# Patient Record
Sex: FEMALE | Race: WHITE | NOT HISPANIC OR LATINO | Employment: FULL TIME
[De-identification: names, ages, dates, MRNs, and addresses within clinical notes are randomized per-mention and may not be internally consistent; named-entity substitution may affect disease eponyms.]

---

## 2022-05-27 NOTE — TELEPHONE ENCOUNTER
FUTURE VISIT INFORMATION      FUTURE VISIT INFORMATION:    Date: 8/8/22    Time: 8:00am    [place] [facility] [place]  REFERRAL INFORMATION:    Referring providers clinic:  self    Reason for visit/diagnosis  Eye lid/skin tags. Patients mom seen Dr. Sandoval for same issue.    RECORDS REQUESTED FROM:       Clinic name Comments Records Status Imaging Status   Edgar Duran recs to collect

## 2022-05-29 ENCOUNTER — HEALTH MAINTENANCE LETTER (OUTPATIENT)
Age: 37
End: 2022-05-29

## 2022-06-22 ENCOUNTER — TELEPHONE (OUTPATIENT)
Dept: OPHTHALMOLOGY | Facility: CLINIC | Age: 37
End: 2022-06-22

## 2022-06-22 NOTE — TELEPHONE ENCOUNTER
Spoke with patient regarding scheduling for a sooner appointment from the waitlist. Patient declined scheduling as offered but will remain on the wait-list.-Per Patient

## 2022-07-08 ENCOUNTER — TELEPHONE (OUTPATIENT)
Dept: OPHTHALMOLOGY | Facility: CLINIC | Age: 37
End: 2022-07-08

## 2022-07-08 NOTE — TELEPHONE ENCOUNTER
Spoke with patient regarding sooner appointment available for the wait-list. Patient was able to schedule as offered. Patient is aware of date, time and location.-Per Patient

## 2022-07-08 NOTE — TELEPHONE ENCOUNTER
FUTURE VISIT INFORMATION      FUTURE VISIT INFORMATION:    Date: 7/11/22    Time: 9:30am    Location: [facility] [place]  REFERRAL INFORMATION:    Referring providers clinic:  self    Reason for visit/diagnosis  Eye lid/skin tags. Patients mom seen Dr. Sandoval for same issue.     RECORDS REQUESTED FROM:         Clinic name Comments Records Status Imaging Status   Edgar Duran recs to collect

## 2022-07-11 ENCOUNTER — PRE VISIT (OUTPATIENT)
Dept: OPHTHALMOLOGY | Facility: CLINIC | Age: 37
End: 2022-07-11

## 2022-07-11 ENCOUNTER — OFFICE VISIT (OUTPATIENT)
Dept: OPHTHALMOLOGY | Facility: CLINIC | Age: 37
End: 2022-07-11
Payer: COMMERCIAL

## 2022-07-11 VITALS — HEIGHT: 61 IN | WEIGHT: 150 LBS | BODY MASS INDEX: 28.32 KG/M2

## 2022-07-11 DIAGNOSIS — H02.9 LESION OF EYELID OF BOTH EYES: Primary | ICD-10-CM

## 2022-07-11 PROCEDURE — 99203 OFFICE O/P NEW LOW 30 MIN: CPT | Mod: 25 | Performed by: OPHTHALMOLOGY

## 2022-07-11 PROCEDURE — 92285 EXTERNAL OCULAR PHOTOGRAPHY: CPT | Mod: GC | Performed by: OPHTHALMOLOGY

## 2022-07-11 PROCEDURE — 88341 IMHCHEM/IMCYTCHM EA ADD ANTB: CPT | Mod: 26 | Performed by: OPHTHALMOLOGY

## 2022-07-11 PROCEDURE — 88342 IMHCHEM/IMCYTCHM 1ST ANTB: CPT | Mod: TC,59 | Performed by: OPHTHALMOLOGY

## 2022-07-11 PROCEDURE — 88305 TISSUE EXAM BY PATHOLOGIST: CPT | Mod: 26 | Performed by: OPHTHALMOLOGY

## 2022-07-11 PROCEDURE — 88342 IMHCHEM/IMCYTCHM 1ST ANTB: CPT | Mod: 26 | Performed by: OPHTHALMOLOGY

## 2022-07-11 PROCEDURE — 17110 DESTRUCTION B9 LES UP TO 14: CPT | Mod: GC | Performed by: OPHTHALMOLOGY

## 2022-07-11 RX ORDER — ERYTHROMYCIN 5 MG/G
OINTMENT OPHTHALMIC ONCE
Status: COMPLETED | OUTPATIENT
Start: 2022-07-11 | End: 2022-07-11

## 2022-07-11 RX ORDER — EPINEPHRINE 0.3 MG/.3ML
INJECTION SUBCUTANEOUS
COMMUNITY
Start: 2021-08-09

## 2022-07-11 RX ORDER — MONTELUKAST SODIUM 10 MG/1
TABLET ORAL
COMMUNITY
Start: 2022-06-03

## 2022-07-11 RX ORDER — CETIRIZINE HYDROCHLORIDE 10 MG/1
10 TABLET ORAL DAILY
COMMUNITY

## 2022-07-11 RX ADMIN — ERYTHROMYCIN 1 G: 5 OINTMENT OPHTHALMIC at 10:27

## 2022-07-11 ASSESSMENT — CONF VISUAL FIELD
METHOD: COUNTING FINGERS
OD_NORMAL: 1
OS_NORMAL: 1

## 2022-07-11 ASSESSMENT — VISUAL ACUITY
OD_CC: 20/20
METHOD: SNELLEN - LINEAR
CORRECTION_TYPE: GLASSES
OS_CC: 20/20

## 2022-07-11 ASSESSMENT — TONOMETRY
OS_IOP_MMHG: 18
OD_IOP_MMHG: 19
IOP_METHOD: ICARE

## 2022-07-11 ASSESSMENT — EXTERNAL EXAM - LEFT EYE: OS_EXAM: NORMAL

## 2022-07-11 ASSESSMENT — EXTERNAL EXAM - RIGHT EYE: OD_EXAM: NORMAL

## 2022-07-11 NOTE — PROGRESS NOTES
Chief Complaints and History of Present Illnesses   Patient presents with     Consult For     Lesions or skin tags around each eye interested in having them removed. Self referred it was suggested by her father to come here he is an MD at [facility] [place]/ Oncology and mom is a patient here too.      Chief Complaint(s) and History of Present Illness(es)     Consult For     Associated symptoms include dryness, tearing and itching.  Negative for   eye pain.  Treatments tried include no treatments.  Pain was noted as   0/10. Additional comments: Lesions or skin tags around each eye interested   in having them removed. Self referred it was suggested by her father to   come here he is an MD at [facility] [place]/ Oncology and mom is a patient here too.               Comments     Has had lesions for years around OU but there is one on LLL that seem to   be getting greater in size for the past month and change in color. Darker   spot has developed in center of largest lesion and its getting scaly. It   has gotten to the point now that it is interfering with vision left eye.   Left eye is uncomfortable or lightly irritated or itches is seem to   compromise closing left eye completely. Some dryness left eye > right eye.    Tearing with left eye > right eye. Vision remains mostly stable aside   from lesion getting in the way with left eye.     Reina Johansen, COT COT 9:46 AM July 11, 2022                                Assessment & Plan     Jenniffer Hicks is a 37 year old female with the following diagnoses:   1. Lesion of eyelid of both eyes         Several small bilateral upper and ower eyelid lesions with one large seb K LLL  - interested in lesion excision  - start emycin baylee TID until empty  - follow up CLIFFORD Holly MD  Oculoplastics Fellow    Attending Physician Attestation:  I have seen and examined this patient with the fellow .  I have confirmed and edited as necessary the chief complaint(s), history of present illness, review  of systems, relevant history, and examination findings as documented by others.  I have personally reviewed the relevant tests, images, and reports as documented above.  I have confirmed and edited as necessary the assessment and plan and agree with this note.    - Martir Sandoval MD 10:46 AM 7/11/2022

## 2022-07-11 NOTE — LETTER
"July 14, 2022      Jenniffer Hicks  [address]  [place] [address]        Dear ,    We are writing to inform you of your test results.    Your biopsy results were normal.  Please call my office if your symptoms worsen or if you have any questions.         Resulted Orders   Surgical Pathology Exam   Result Value Ref Range    Case Report       Surgical Pathology Report                         Case: [id number]                                  Authorizing Provider:  Martir Sandoval MD       Collected:           07/11/2022 10:27 AM          Ordering Location:     [facility]      Received:            07/11/2022 10:54 AM                                 [facility]                                                              Pathologist:           Sapna Milton MD                                                         Specimen:    Eyelid, Lower, Left, r/o malignancy                                                        Final Diagnosis       Lower eyelid, left, biopsy: Findings most consistent with inflamed seborrheic keratosis (see Comment).       Comment       There is atypia felt to be reactive secondary to inflammation. However, any recurrence should be re-biopsied and re-evaluated histopathologically.       Clinical Information       The patient is a 37 year old female with a lesion of the left lower eyelid increasing in size and darkening in color. She undergoes biopsy on the left.      Gross Description       A(A). Eyelid, Lower, Left, r/o malignancy:  The specimen is received in formalin with proper patient identification, labeled \"eyelid, lower\".  The specimen consists of a 0.5 x 0.5 x 0.2 cm polypoid piece of white-tan skin.  The subcutaneous surface is inked blue, and the specimen is bisected and entirely submitted in cassette A1.       Microscopic Description       The tissue consists of skin with hyperkeratosis, acanthosis, and papillomatosis. There is moderate atypia of the " epithelial basal layers. An infiltrate of mature lymphocytes and plasma cells is present within the dermis at the base of the lesion. On immunohistochemistry with Ki-67, there is increased cell proliferation within and in some areas above the basal layers. AE1/AE3 highlights the epidermis, and no invasive lesion is seen.      Performing Labs       The technical component of this testing was completed at [facility]      Case Images         If you have any questions or concerns, please call the clinic at the number listed above.       Sincerely,      Martir Sandoval MD

## 2022-07-11 NOTE — PATIENT INSTRUCTIONS
Martir Sandoval M.D.    POST OPERATIVE INSTRUCTIONS   OFFICE EYELID SURGERY      Ice pack immediately after surgery. Alternate ten minutes on and ten minutes off for the first 24 - 48 hours, while in a reclined position with two pillows under your head while awake.     You can use Tylenol extra strength for pain as directed on the bottle.     Sleep with two pillows under your head for the first night following surgery.      If bandaged, remove the dressing 24 hours after surgery.     Use ointment along the incision both morning and evening until your return visit. If you get ointment in your eye, it will blur your vision slightly.     Avoid aspirin or anti-inflammatory medications such as Advil or Motrin for one week following your surgery unless otherwise directed.     Avoid strenuous activity or straining for the first week after surgery.     Bathing and showers are OK but to facilitate healing, do not wash or apply water to the sutured areas.     Small amounts of bright red blood normally stain the bandages. Some blurring of vision can be expected due to drainage and ointment in the eyes.     If your eyes swell shut, you cannot establish vision in the operated eye, or the pain is not reasonably controlled with medication you must contact the eye clinic immediately.     If you should have a problem after normal office hours, you can reach the ophthalmologist on call at [phone]. If for some reason no one can be reached, proceed to the nearest emergency room for evaluation and treatment.

## 2022-07-11 NOTE — NURSING NOTE
Chief Complaints and History of Present Illnesses   Patient presents with     Consult For     Lesions or skin tags around each eye interested in having them removed. Self referred it was suggested by her father to come here he is an MD at [facility] [place]/ Oncology and mom is a patient here too.      Chief Complaint(s) and History of Present Illness(es)     Consult For     Associated symptoms: dryness, tearing and itching.  Negative for eye pain    Treatments tried: no treatments    Pain scale: 0/10    Comments: Lesions or skin tags around each eye interested in having them removed. Self referred it was suggested by her father to come here he is an MD at [facility] [place]/ Oncology and mom is a patient here too.               Comments     Has had lesions for years around OU but there is one on LLL that seem to be getting greater in size for the past month and change in color. Darker spot has developed in center of largest lesion and its getting scaly. It has gotten to the point now that it is interfering with vision left eye. Left eye is uncomfortable or lightly irritated or itches is seem to compromise closing left eye completely. Some dryness left eye > right eye.  Tearing with left eye > right eye. Vision remains mostly stable aside from lesion getting in the way with left eye.     Reina Johansen, COT COT 9:46 AM July 11, 2022

## 2022-07-11 NOTE — LETTER
"July 14, 2022      Jenniffer Hicks  [address]  [place] [address]        Dear ,    We are writing to inform you of your test results.    Your biopsy results were normal.  Please call my office if your symptoms worsen or if you have any questions.         Resulted Orders   Surgical Pathology Exam   Result Value Ref Range    Case Report       Surgical Pathology Report                         Case: [id number]                                  Authorizing Provider:  Martir Sandoval MD       Collected:           07/11/2022 10:27 AM          Ordering Location:     [facility]      Received:            07/11/2022 10:54 AM                                 [facility]                                                              Pathologist:           Sapna Milton MD                                                         Specimen:    Eyelid, Lower, Left, r/o malignancy                                                        Final Diagnosis       Lower eyelid, left, biopsy: Findings most consistent with inflamed seborrheic keratosis (see Comment).       Comment       There is atypia felt to be reactive secondary to inflammation. However, any recurrence should be re-biopsied and re-evaluated histopathologically.       Clinical Information       The patient is a 37 year old female with a lesion of the left lower eyelid increasing in size and darkening in color. She undergoes biopsy on the left.      Gross Description       A(A). Eyelid, Lower, Left, r/o malignancy:  The specimen is received in formalin with proper patient identification, labeled \"eyelid, lower\".  The specimen consists of a 0.5 x 0.5 x 0.2 cm polypoid piece of white-tan skin.  The subcutaneous surface is inked blue, and the specimen is bisected and entirely submitted in cassette A1.       Microscopic Description       The tissue consists of skin with hyperkeratosis, acanthosis, and papillomatosis. There is moderate atypia of the " epithelial basal layers. An infiltrate of mature lymphocytes and plasma cells is present within the dermis at the base of the lesion. On immunohistochemistry with Ki-67, there is increased cell proliferation within and in some areas above the basal layers. AE1/AE3 highlights the epidermis, and no invasive lesion is seen.      Performing Labs       The technical component of this testing was completed at [facility]      Case Images         If you have any questions or concerns, please call the clinic at the number listed above.       Sincerely,      Martir Sandoval MD

## 2022-07-11 NOTE — NURSING NOTE
Chief Complaints and History of Present Illnesses   Patient presents with     Consult For     Lesions or skin tags around each eye interested in having them removed. Self referred it was suggested by her father to come here he is an MD at [facility] [place]/ [place] and mom is a patient here too.      Chief Complaint(s) and History of Present Illness(es)     Consult For     Associated symptoms: dryness, tearing and itching.  Negative for eye pain    Treatments tried: no treatments    Pain scale: 0/10    Comments: Lesions or skin tags around each eye interested in having them removed. Self referred it was suggested by her father to come here he is an MD at [facility] [place]/ [place] and mom is a patient here too.               Comments     Has had lesions for year but there is one on LLL that seem to be getting greater in size for the past month and change in color. Dark spot has developed in center of largest lesion and its getting scaly. It has gotten to the point now that it is interfering with vision left eye. Left eye is uncomfortable or lightly irritated or itches is seem to compromise closing left eye completely. Some dryness left eye > right eye.  Tearing with left eye > right eye. Vision remains mostly stable aside from lesion getting in the way with left eye.     Reina Johansen, COT COT 9:46 AM July 11, 2022

## 2022-07-12 LAB
PATH REPORT.COMMENTS IMP SPEC: NORMAL
PATH REPORT.FINAL DX SPEC: NORMAL
PATH REPORT.GROSS SPEC: NORMAL
PATH REPORT.MICROSCOPIC SPEC OTHER STN: NORMAL
PATH REPORT.RELEVANT HX SPEC: NORMAL
PHOTO IMAGE: NORMAL

## 2022-08-08 ENCOUNTER — PRE VISIT (OUTPATIENT)
Dept: OPHTHALMOLOGY | Facility: CLINIC | Age: 37
End: 2022-08-08

## 2022-10-02 ENCOUNTER — HEALTH MAINTENANCE LETTER (OUTPATIENT)
Age: 37
End: 2022-10-02

## 2023-06-04 ENCOUNTER — HEALTH MAINTENANCE LETTER (OUTPATIENT)
Age: 38
End: 2023-06-04

## 2024-03-09 ENCOUNTER — HEALTH MAINTENANCE LETTER (OUTPATIENT)
Age: 39
End: 2024-03-09

## 2024-05-15 ENCOUNTER — HOSPITAL ENCOUNTER (OUTPATIENT)
Dept: MAMMOGRAPHY | Facility: CLINIC | Age: 39
Discharge: HOME | End: 2024-05-15
Admitting: INTERNAL MEDICINE
Payer: COMMERCIAL

## 2024-05-15 DIAGNOSIS — Z12.31 VISIT FOR SCREENING MAMMOGRAM: ICD-10-CM

## 2024-05-15 PROCEDURE — 77063 BREAST TOMOSYNTHESIS BI: CPT

## 2024-12-14 ENCOUNTER — HEALTH MAINTENANCE LETTER (OUTPATIENT)
Age: 39
End: 2024-12-14

## 2025-06-17 ENCOUNTER — ANCILLARY PROCEDURE (OUTPATIENT)
Dept: RADIOLOGY | Facility: CLINIC | Age: 40
End: 2025-06-17
Payer: COMMERCIAL

## 2025-06-20 ENCOUNTER — ANCILLARY PROCEDURE (OUTPATIENT)
Dept: MAMMOGRAPHY | Facility: CLINIC | Age: 40
End: 2025-06-20
Attending: INTERNAL MEDICINE
Payer: COMMERCIAL

## 2025-06-20 DIAGNOSIS — R92.8 ABNORMAL MAMMOGRAM: ICD-10-CM

## 2025-06-20 PROCEDURE — 77065 DX MAMMO INCL CAD UNI: CPT | Mod: LT

## 2025-06-20 PROCEDURE — G0279 TOMOSYNTHESIS, MAMMO: HCPCS

## 2025-06-20 PROCEDURE — 76642 ULTRASOUND BREAST LIMITED: CPT | Mod: LT

## (undated) RX ORDER — ERYTHROMYCIN 5 MG/G
OINTMENT OPHTHALMIC
Status: DISPENSED
Start: 2022-07-11